# Patient Record
Sex: FEMALE | Race: BLACK OR AFRICAN AMERICAN
[De-identification: names, ages, dates, MRNs, and addresses within clinical notes are randomized per-mention and may not be internally consistent; named-entity substitution may affect disease eponyms.]

---

## 2021-06-26 ENCOUNTER — HOSPITAL ENCOUNTER (EMERGENCY)
Dept: HOSPITAL 54 - ER | Age: 26
Discharge: HOME | End: 2021-06-26
Payer: SELF-PAY

## 2021-06-26 VITALS — DIASTOLIC BLOOD PRESSURE: 82 MMHG | SYSTOLIC BLOOD PRESSURE: 140 MMHG

## 2021-06-26 VITALS — BODY MASS INDEX: 30.56 KG/M2 | HEIGHT: 64 IN | WEIGHT: 179 LBS

## 2021-06-26 DIAGNOSIS — A64: Primary | ICD-10-CM

## 2021-06-26 DIAGNOSIS — B00.9: ICD-10-CM

## 2022-11-16 ENCOUNTER — HOSPITAL ENCOUNTER (EMERGENCY)
Dept: HOSPITAL 54 - ER | Age: 27
Discharge: HOME | End: 2022-11-16
Payer: COMMERCIAL

## 2022-11-16 VITALS — HEIGHT: 65 IN | WEIGHT: 170 LBS | BODY MASS INDEX: 28.32 KG/M2

## 2022-11-16 VITALS — SYSTOLIC BLOOD PRESSURE: 133 MMHG | DIASTOLIC BLOOD PRESSURE: 76 MMHG
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
## 2022-11-16 DIAGNOSIS — Z79.899: ICD-10-CM

## 2022-11-16 DIAGNOSIS — R22.0: Primary | ICD-10-CM

## 2022-11-16 DIAGNOSIS — T50.Z95A: ICD-10-CM

## 2022-11-16 NOTE — NUR
Patient discharged to home in stable condition. Written and verbal after care 
instructions given. Patient verbalizes understanding of instruction. Written 
prescription obtained from md and given to pt.

## 2024-09-24 ENCOUNTER — HOSPITAL ENCOUNTER (EMERGENCY)
Dept: HOSPITAL 54 - ER | Age: 29
Discharge: HOME | End: 2024-09-24
Payer: MEDICAID

## 2024-09-24 VITALS — SYSTOLIC BLOOD PRESSURE: 126 MMHG | OXYGEN SATURATION: 98 % | TEMPERATURE: 98.4 F | DIASTOLIC BLOOD PRESSURE: 72 MMHG

## 2024-09-24 VITALS — HEIGHT: 65 IN | WEIGHT: 165 LBS | BODY MASS INDEX: 27.49 KG/M2

## 2024-09-24 DIAGNOSIS — H57.89: ICD-10-CM

## 2024-09-24 DIAGNOSIS — T78.49XA: Primary | ICD-10-CM
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
## 2024-09-24 RX ADMIN — FAMOTIDINE ONE MG: 20 TABLET, FILM COATED ORAL at 16:23
